# Patient Record
Sex: MALE | Race: WHITE | NOT HISPANIC OR LATINO | Employment: FULL TIME | URBAN - METROPOLITAN AREA
[De-identification: names, ages, dates, MRNs, and addresses within clinical notes are randomized per-mention and may not be internally consistent; named-entity substitution may affect disease eponyms.]

---

## 2021-06-23 ENCOUNTER — HOSPITAL ENCOUNTER (EMERGENCY)
Facility: MEDICAL CENTER | Age: 51
End: 2021-06-23
Attending: EMERGENCY MEDICINE | Admitting: EMERGENCY MEDICINE
Payer: COMMERCIAL

## 2021-06-23 VITALS
OXYGEN SATURATION: 95 % | RESPIRATION RATE: 15 BRPM | TEMPERATURE: 98 F | WEIGHT: 225.31 LBS | DIASTOLIC BLOOD PRESSURE: 81 MMHG | HEART RATE: 82 BPM | SYSTOLIC BLOOD PRESSURE: 127 MMHG | HEIGHT: 70 IN | BODY MASS INDEX: 32.26 KG/M2

## 2021-06-23 DIAGNOSIS — T81.31XA SURGICAL WOUND DEHISCENCE, INITIAL ENCOUNTER: ICD-10-CM

## 2021-06-23 PROCEDURE — 99283 EMERGENCY DEPT VISIT LOW MDM: CPT

## 2021-06-23 ASSESSMENT — LIFESTYLE VARIABLES: DO YOU DRINK ALCOHOL: NO

## 2021-06-23 NOTE — ED TRIAGE NOTES
Tomy De La Torre  50 y.o. male  Chief Complaint   Patient presents with   • Other       Patient states he had eye lift surgery 1 week ago and he had his stiches taken out yesterday. When we he woke up today he thinks he might have accidentally rubbed his eyelid and the incision site opened. Patient is requesting for it to be sutured back. Patient denies any other complaints.    Vitals:    06/23/21 0713   BP: 127/81   Pulse: 82   Resp: 15   Temp: 36.7 °C (98 °F)   SpO2: 95%

## 2021-06-23 NOTE — ED PROVIDER NOTES
"ED Provider Note    CHIEF COMPLAINT  Chief Complaint   Patient presents with   • Other       HPI  Tomy De La Torre is a 50 y.o. male who presents for evaluation of a dehisced surgical incision.  A little over 1 week ago he underwent a bilateral eye lift, his sutures were removed yesterday and he states he woke up this morning he noticed that the incision above his right eye has dehisced.  No pain.  No fever.  He is requesting some sutures or Steri-Strips.    REVIEW OF SYSTEMS  Negative for fever, vision changes    PAST MEDICAL HISTORY       SOCIAL HISTORY  Social History     Tobacco Use   • Smoking status: Never Smoker   • Smokeless tobacco: Never Used   Substance and Sexual Activity   • Alcohol use: Yes     Comment: rarely   • Drug use: Never   • Sexual activity: Not on file       SURGICAL HISTORY  patient denies any surgical history    CURRENT MEDICATIONS  I personally reviewed the medication list in the charting documentation.     ALLERGIES  No Known Allergies    PHYSICAL EXAM  VITAL SIGNS: /81   Pulse 82   Temp 36.7 °C (98 °F) (Temporal)   Resp 15   Ht 1.778 m (5' 10\")   Wt 102 kg (225 lb 5 oz)   SpO2 95%   BMI 32.33 kg/m²   Constitutional: Well appearing patient in no acute distress.  Awake and alert, not toxic nor ill in appearance.  HENT: Inspection of the region of the right eye reveals a surgical scar horizontally oriented in the crease of the upper eyelid, the lateral 2 cm the scar has dehisced.  No active bleeding.  Neck: Comfortable movement without any obvious restriction in the range of motion.  Eyes: Conjunctiva normal, Non-icteric.   Chest: Normal nonlabored respirations.  Skin: The exposed portions of skin reveal no obvious rash or other abnormalities.  Musculoskeletal: No obvious restriction in the range of motion in all major joints.   Neurologic: Alert, No obvious focal deficits noted.   Psychiatric: Affect normal for clinical presentation    DIAGNOSTIC STUDIES / " PROCEDURES      COURSE & MEDICAL DECISION MAKING  Pertinent Labs & Imaging studies reviewed. (See chart for details)    Encounter Summary: This is a very pleasant 50 y.o. male who unfortunately required evaluation in the emergency department today with a dehisced surgical incision about 8 days status post an oculoplastics procedure, had his stitches removed yesterday, the right upper eyelid incision has partially dehisced.  I am going to contact Dr. Castellon, the oculoplastic surgeon in [facility] who performed the surgery ------ I was able to speak to Dr. Castellon about the dehiscence.  His preference is to have a local oculoplastic surgeon perform a repair.  Unfortunately we do not have the oculoplastic surgeon on-call for us today.  I did reach out to the oculoplastic provider in [facility] who was gracious enough to return my call.  The discussing the case he states that despite a very busy day in his office with multiple emergency add-ons but he has graciously agreed to try to get the patient in in between other patients and repair of this dehisced laceration.  Because he is not on-call, I did convey to the patient possibility of out-of-pocket pain the patient and his wife expressed understanding.  He will go directly to Dr. Bocanegra's office.    DISPOSITION: Discharge Home      FINAL IMPRESSION  1. Surgical wound dehiscence, initial encounter        This dictation was created using voice recognition software. The accuracy of the dictation is limited to the abilities of the software. I expect there may be some errors of grammar and possibly content. The nursing notes were reviewed and certain aspects of this information were incorporated into this note.    Electronically signed by: Ayo Alan M.D., 6/23/2021 8:17 AM